# Patient Record
Sex: MALE | Race: WHITE | NOT HISPANIC OR LATINO | Employment: FULL TIME | URBAN - METROPOLITAN AREA
[De-identification: names, ages, dates, MRNs, and addresses within clinical notes are randomized per-mention and may not be internally consistent; named-entity substitution may affect disease eponyms.]

---

## 2017-12-19 ENCOUNTER — HOSPITAL ENCOUNTER (EMERGENCY)
Facility: HOSPITAL | Age: 52
Discharge: HOME | End: 2017-12-19
Attending: EMERGENCY MEDICINE | Admitting: EMERGENCY MEDICINE
Payer: COMMERCIAL

## 2017-12-19 ENCOUNTER — APPOINTMENT (EMERGENCY)
Dept: RADIOLOGY | Facility: HOSPITAL | Age: 52
End: 2017-12-19
Payer: COMMERCIAL

## 2017-12-19 VITALS
TEMPERATURE: 98.7 F | DIASTOLIC BLOOD PRESSURE: 84 MMHG | RESPIRATION RATE: 18 BRPM | BODY MASS INDEX: 28.14 KG/M2 | HEIGHT: 69 IN | HEART RATE: 101 BPM | OXYGEN SATURATION: 96 % | WEIGHT: 190 LBS | SYSTOLIC BLOOD PRESSURE: 158 MMHG

## 2017-12-19 DIAGNOSIS — S39.012A LUMBAR STRAIN: Primary | ICD-10-CM

## 2017-12-19 PROCEDURE — 72100 X-RAY EXAM L-S SPINE 2/3 VWS: CPT

## 2017-12-19 PROCEDURE — 99284 EMERGENCY DEPT VISIT MOD MDM: CPT

## 2017-12-19 RX ORDER — IBUPROFEN 400 MG/1
800 TABLET ORAL ONCE
Status: COMPLETED | OUTPATIENT
Start: 2017-12-19 | End: 2017-12-19

## 2017-12-19 RX ORDER — CYCLOBENZAPRINE HCL 10 MG
10 TABLET ORAL
Qty: 7 TABLET | Refills: 0 | Status: SHIPPED | OUTPATIENT
Start: 2017-12-19 | End: 2019-01-13

## 2017-12-19 RX ADMIN — IBUPROFEN 800 MG: 400 TABLET, FILM COATED ORAL at 11:28
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
## 2017-12-19 NOTE — DISCHARGE INSTRUCTIONS
Low Back Strain   WHAT YOU NEED TO KNOW:   Low back strain is an injury to your lower back muscles or tendons  Tendons are strong tissues that connect muscles to bones  The lower back supports most of your body weight and helps you move, twist, and bend  DISCHARGE INSTRUCTIONS:   Return to the emergency department if:   · You hear or feel a pop in your lower back  · You have increased swelling or pain in your lower back  · You have trouble moving your legs  · Your legs are numb  Contact your healthcare provider if:   · You have a fever  · Your pain does not go away, even after treatment  · You have questions or concerns about your condition or care  Medicines: The following medicines may be ordered by your healthcare provider:  · Acetaminophen decreases pain and fever  It is available without a doctor's order  Ask how much to take and how often to take it  Follow directions  Acetaminophen can cause liver damage if not taken correctly  · NSAIDs , such as ibuprofen, help decrease swelling, pain, and fever  This medicine is available with or without a doctor's order  NSAIDs can cause stomach bleeding or kidney problems in certain people  If you take blood thinner medicine, always ask your healthcare provider if NSAIDs are safe for you  Always read the medicine label and follow directions  · Muscle relaxers  help decrease pain and muscle spasms  · Prescription pain medicine  may be given  Ask how to take this medicine safely  · Take your medicine as directed  Contact your healthcare provider if you think your medicine is not helping or if you have side effects  Tell him or her if you are allergic to any medicine  Keep a list of the medicines, vitamins, and herbs you take  Include the amounts, and when and why you take them  Bring the list or the pill bottles to follow-up visits  Carry your medicine list with you in case of an emergency  Self-care:   · Rest  as directed   You may need to rest in bed for a period of time after your injury  Do not lift heavy objects  · Apply ice  on your back for 15 to 20 minutes every hour or as directed  Use an ice pack, or put crushed ice in a plastic bag  Cover it with a towel  Ice helps prevent tissue damage and decreases swelling and pain  · Apply heat  on your lower back for 20 to 30 minutes every 2 hours for as many days as directed  Heat helps decrease pain and muscle spasms  · Slowly start to increase your activity  as the pain decreases, or as directed  Prevent another low back strain:   · Use correct body movements  ¨ Bend at the hips and knees when you  objects  Do not bend from the waist  Use your leg muscles as you lift the load  Do not use your back  Keep the object close to your chest as you lift it  Try not to twist or lift anything above your waist     ¨ Change your position often when you stand for long periods of time  Rest one foot on a small box or footrest, and then switch to the other foot often  ¨ Try not to sit for long periods of time  When you do, sit in a straight-backed chair with your feet flat on the floor  ¨ Never reach, pull, or push while you are sitting  · Warm up before you exercise  Do exercises that strengthen your back muscles  Ask your healthcare provider about the best exercise plan for you  · Maintain a healthy weight  Ask your healthcare provider how much you should weigh  Ask him to help you create a weight loss plan if you are overweight  © 2017 [address] Information is for End User's use only and may not be sold, redistributed or otherwise used for commercial purposes  All illustrations and images included in CareNotes® are the copyrighted property of [employer]  or Erasmo Mendez  The above information is an  only  It is not intended as medical advice for individual conditions or treatments   Talk to your doctor, nurse or pharmacist before following any medical regimen to see if it is safe and effective for you

## 2019-01-13 ENCOUNTER — HOSPITAL ENCOUNTER (EMERGENCY)
Facility: HOSPITAL | Age: 54
Discharge: HOME | End: 2019-01-13
Attending: EMERGENCY MEDICINE | Admitting: EMERGENCY MEDICINE
Payer: COMMERCIAL

## 2019-01-13 VITALS
HEART RATE: 93 BPM | OXYGEN SATURATION: 97 % | SYSTOLIC BLOOD PRESSURE: 163 MMHG | DIASTOLIC BLOOD PRESSURE: 95 MMHG | TEMPERATURE: 98.2 F | RESPIRATION RATE: 20 BRPM

## 2019-01-13 DIAGNOSIS — G43.909 MIGRAINE: Primary | ICD-10-CM

## 2019-01-13 PROCEDURE — 96375 TX/PRO/DX INJ NEW DRUG ADDON: CPT

## 2019-01-13 PROCEDURE — 99283 EMERGENCY DEPT VISIT LOW MDM: CPT

## 2019-01-13 PROCEDURE — 96361 HYDRATE IV INFUSION ADD-ON: CPT

## 2019-01-13 PROCEDURE — 96374 THER/PROPH/DIAG INJ IV PUSH: CPT

## 2019-01-13 RX ORDER — METOCLOPRAMIDE HYDROCHLORIDE 5 MG/ML
10 INJECTION INTRAMUSCULAR; INTRAVENOUS ONCE
Status: COMPLETED | OUTPATIENT
Start: 2019-01-13 | End: 2019-01-13

## 2019-01-13 RX ORDER — NAPROXEN 500 MG/1
500 TABLET ORAL 2 TIMES DAILY WITH MEALS
Qty: 30 TABLET | Refills: 0 | Status: SHIPPED | OUTPATIENT
Start: 2019-01-13

## 2019-01-13 RX ORDER — DIPHENHYDRAMINE HYDROCHLORIDE 50 MG/ML
25 INJECTION INTRAMUSCULAR; INTRAVENOUS ONCE
Status: COMPLETED | OUTPATIENT
Start: 2019-01-13 | End: 2019-01-13

## 2019-01-13 RX ORDER — KETOROLAC TROMETHAMINE 30 MG/ML
15 INJECTION, SOLUTION INTRAMUSCULAR; INTRAVENOUS ONCE
Status: COMPLETED | OUTPATIENT
Start: 2019-01-13 | End: 2019-01-13

## 2019-01-13 RX ADMIN — METOCLOPRAMIDE 10 MG: 5 INJECTION, SOLUTION INTRAMUSCULAR; INTRAVENOUS at 22:12

## 2019-01-13 RX ADMIN — SODIUM CHLORIDE 1000 ML: 0.9 INJECTION, SOLUTION INTRAVENOUS at 22:10

## 2019-01-13 RX ADMIN — DIPHENHYDRAMINE HYDROCHLORIDE 25 MG: 50 INJECTION, SOLUTION INTRAMUSCULAR; INTRAVENOUS at 22:12

## 2019-01-13 RX ADMIN — KETOROLAC TROMETHAMINE 15 MG: 30 INJECTION, SOLUTION INTRAMUSCULAR at 22:11

## 2019-01-14 NOTE — ED NOTES
Pt reported headache is better 6/10 at this time  Pt also reported that blurry vision went away  Awake and alert  Ambulate to bathroom        Jassi Ashraf RN  01/13/19 4895

## 2019-01-14 NOTE — ED NOTES
Pt made aware that he is not allowed to drive home after medication  Daughter at bedside who is 20 yo will drive him home, she has driving permit  Pt is agreeable to let her daughter drive        Angel Saldaña RN  01/13/19 7692

## 2019-01-14 NOTE — ED PROVIDER NOTES
History  Chief Complaint   Patient presents with    Headache     headache since 3pm  no relief with motrin  or dimming the lights  some nausea and photophobia     HPI    This is a 47 yo M who has history of migraines daily, herniated cervical disc who presents with headaches  Patient states today around 3pm he started with headache  States this feels like his normal headaches and not worst headache of his life  States took motrin with no relief  Denies chest pain or shortness of breath  No weakness numbness or tingling  States he has bad vision and does not wear glassess  Mild nausea no vomiting  No neck pain  Impression: 47 yo M with migraine headache  No alarming signs will do migraine cocktail     None       Past Medical History:   Diagnosis Date    Cellulitis     left leg    Herniated cervical disc     Pneumonia     TIA (transient ischemic attack)     Pt reports having had a mini stroke years ago with no residual        Past Surgical History:   Procedure Laterality Date    LEG SURGERY Left     I & D       History reviewed  No pertinent family history  I have reviewed and agree with the history as documented  Social History   Substance Use Topics    Smoking status: Never Smoker    Smokeless tobacco: Never Used    Alcohol use No        Review of Systems   Constitutional: Negative  Negative for diaphoresis and fever  HENT: Negative  Respiratory: Negative  Negative for cough, shortness of breath and wheezing  Cardiovascular: Negative  Negative for chest pain, palpitations and leg swelling  Gastrointestinal: Positive for nausea  Negative for abdominal distention, abdominal pain and vomiting  Genitourinary: Negative  Musculoskeletal: Negative  Skin: Negative  Neurological: Positive for headaches  Psychiatric/Behavioral: Negative  All other systems reviewed and are negative  Physical Exam  Physical Exam   Constitutional: He is oriented to person, place, and time   He appears well-developed and well-nourished  No distress  HENT:   Head: Normocephalic and atraumatic  Nose: Nose normal    Mouth/Throat: Oropharynx is clear and moist    Eyes: Pupils are equal, round, and reactive to light  Conjunctivae and EOM are normal    Neck: Normal range of motion  Neck supple  Cardiovascular: Normal rate, regular rhythm and normal heart sounds  No murmur heard  Pulmonary/Chest: Effort normal and breath sounds normal  No respiratory distress  He has no wheezes  He has no rales  Abdominal: Soft  Bowel sounds are normal  He exhibits no distension  There is no tenderness  There is no rebound and no guarding  Musculoskeletal: Normal range of motion  He exhibits no edema, tenderness or deformity  Neurological: He is alert and oriented to person, place, and time  No cranial nerve deficit  Skin: Skin is warm and dry  No rash noted  He is not diaphoretic  No pallor  Psychiatric: He has a normal mood and affect  Vitals reviewed        Vital Signs  ED Triage Vitals [01/13/19 2131]   Temperature Pulse Respirations Blood Pressure SpO2   98 2 °F (36 8 °C) 93 20 163/95 97 %      Temp src Heart Rate Source Patient Position - Orthostatic VS BP Location FiO2 (%)   -- -- Sitting Right arm --      Pain Score       Worst Possible Pain           Vitals:    01/13/19 2131   BP: 163/95   Pulse: 93   Patient Position - Orthostatic VS: Sitting       Visual Acuity      ED Medications  Medications   sodium chloride 0 9 % bolus 1,000 mL (0 mL Intravenous Stopped 1/13/19 2306)   diphenhydrAMINE (BENADRYL) injection 25 mg (25 mg Intravenous Given 1/13/19 2212)   metoclopramide (REGLAN) injection 10 mg (10 mg Intravenous Given 1/13/19 2212)   ketorolac (TORADOL) injection 15 mg (15 mg Intravenous Given 1/13/19 2211)       Diagnostic Studies  Results Reviewed     None                 No orders to display              Procedures  Procedures       Phone Contacts  ED Phone Contact    ED Course  ED Course as of Jan 14 3943   Leticia Bigger Jan 13, 2019   2243 Patient feeling much better  States he would like to drink some water                                MDM  CritCare Time    Disposition  Final diagnoses:   Migraine     Time reflects when diagnosis was documented in both MDM as applicable and the Disposition within this note     Time User Action Codes Description Comment    1/13/2019 11:00 PM Michael Bustillos  8  [N23 [id number]] Migraine       ED Disposition     ED Disposition Condition Comment    Discharge  Mindy Hicks  discharge to home/self care  Condition at discharge: Good        Follow-up Information     Follow up With Specialties Details Why Contact Info    Amina Jimenez MD Family Medicine Schedule an appointment as soon as possible for a visit  [address]  [phone]            Discharge Medication List as of 1/13/2019 11:00 PM      START taking these medications    Details   naproxen (NAPROSYN) 500 mg tablet Take 1 tablet (500 mg total) by mouth 2 (two) times a day with meals, Starting Sun 1/13/2019, Print           No discharge procedures on file      ED Provider  Electronically Signed by           Lillard Landau, MD  01/14/19 9029

## 2019-01-14 NOTE — DISCHARGE INSTRUCTIONS
Migraine Headache   WHAT YOU SHOULD KNOW:   A migraine is a severe headache  The pain can be so severe that it interferes with your daily activities  A migraine can last a few hours up to several days  The exact cause of migraines is not known  It may be caused by changes in your body chemicals and extra sensitive nerves in your brain  AFTER YOU LEAVE:   Medicines:  Take medicine as soon as you feel a migraine begin  · Pain medicine: You may need medicine to take away or decrease pain  You may need a doctor's order for this medicine  Do not wait until the pain is severe before you take your medicine  · Migraine medicines: These are used to help prevent a migraine or stop it once it starts  · Antinausea medicine: This medicine may be given to calm your stomach and to help prevent vomiting  They can also help relieve pain  · Take your medicine as directed  Call your healthcare provider if you think your medicine is not helping or if you have side effects  Tell him if you are allergic to any medicine  Keep a list of the medicines, vitamins, and herbs you take  Include the amounts, and when and why you take them  Bring the list or the pill bottles to follow-up visits  Carry your medicine list with you in case of an emergency  Manage your symptoms:   · Rest:  Rest in a dark, quiet room  This will help decrease your pain  · Ice:  Ice helps decrease pain  Use an ice pack or put crushed ice in a plastic bag  Cover the ice pack with a towel and place it on your head where it hurts for 15 to 20 minutes every hour  · Heat:  Heat helps decrease pain and muscle spasms  Use a small towel dampened with warm water or a heating pad, or sit in a warm bath  Apply heat on the area for 20 to 30 minutes every 2 hours  You may alternate heat and ice  Keep a headache diary:  Write down when your migraines start and stop  Include your symptoms and what you were doing when a migraine began   Record what you ate or drank for 24 hours before the migraine started  Describe the pain and where it hurts  Keep track of what you did to treat your migraine and whether it worked  Follow up with your primary healthcare provider or neurologist as directed:  Bring your headache diary with you when you see your primary healthcare provider  Write down your questions so you remember to ask them during your visits  Prevent another migraine:   · Do not smoke: If you smoke, it is never too late to quit  Tobacco smoke can trigger a migraine  It can also cause heart disease, lung disease, cancer, and other health problems  Quitting smoking will improve your health and the health of those around you  If you smoke, ask for information about how to stop  · Do not drink alcohol:  Alcohol can trigger a migraine  It can also interfere with the medicines used to treat your migraine  · Get regular exercise:  Exercise may help prevent migraines  Talk to your primary healthcare provider about the best exercise plan for you  · Manage stress:  Stress may trigger a migraine  Learn new ways to relax, such as deep breathing  · Stick to a sleep schedule:  Go to bed and get up at the same time each day  · Eat regular meals:  Include healthy foods such as include fruit, vegetables, whole-grain breads, low-fat dairy products, beans, lean meat, and fish  Avoid trigger foods like chocolate, hard cheese, and red wine  Foods that contain gluten, nitrates, MSG, or artificial sweeteners may also trigger migraines  Caffeine, which is often used to treat migraines, can also trigger them  Contact your primary healthcare provider or neurologist if:   · You have a fever  · Your migraines interfere with your daily activities  · Your medicines or treatments stop working  · You have questions about your condition or care    Seek care immediately or call 911 if:   · You have a headache that seems different or much worse than your usual migraine headache  · You have a severe headache with a fever or a stiff neck  · You have new problems with speech, vision, balance, or movement  · You feel like you are going to faint, you become confused, or you have a seizure  © 2014 [address] is for End User's use only and may not be sold, redistributed or otherwise used for commercial purposes  All illustrations and images included in CareNotes® are the copyrighted property of [employer]  or Erasmo Mendez  The above information is an  only  It is not intended as medical advice for individual conditions or treatments  Talk to your doctor, nurse or pharmacist before following any medical regimen to see if it is safe and effective for you